# Patient Record
Sex: FEMALE | Race: WHITE | NOT HISPANIC OR LATINO | URBAN - METROPOLITAN AREA
[De-identification: names, ages, dates, MRNs, and addresses within clinical notes are randomized per-mention and may not be internally consistent; named-entity substitution may affect disease eponyms.]

---

## 2021-04-28 ENCOUNTER — APPOINTMENT (OUTPATIENT)
Age: 51
Setting detail: DERMATOLOGY
End: 2021-05-11

## 2021-04-28 VITALS — WEIGHT: 293 LBS | RESPIRATION RATE: 18 BRPM | HEIGHT: 72 IN | TEMPERATURE: 97.8 F

## 2021-04-28 DIAGNOSIS — Z79.899 OTHER LONG TERM (CURRENT) DRUG THERAPY: ICD-10-CM

## 2021-04-28 DIAGNOSIS — L73.2 HIDRADENITIS SUPPURATIVA: ICD-10-CM

## 2021-04-28 PROCEDURE — OTHER MIPS QUALITY: OTHER

## 2021-04-28 PROCEDURE — OTHER MEDICATION COUNSELING: OTHER

## 2021-04-28 PROCEDURE — 11900 INJECT SKIN LESIONS </W 7: CPT

## 2021-04-28 PROCEDURE — OTHER PRESCRIPTION MEDICATION MANAGEMENT: OTHER

## 2021-04-28 PROCEDURE — OTHER PRESCRIPTION: OTHER

## 2021-04-28 PROCEDURE — OTHER COUNSELING: OTHER

## 2021-04-28 PROCEDURE — 99202 OFFICE O/P NEW SF 15 MIN: CPT | Mod: 25

## 2021-04-28 PROCEDURE — OTHER INTRALESIONAL KENALOG: OTHER

## 2021-04-28 RX ORDER — SPIRONOLACTONE 100 MG/1
TABLET, FILM COATED ORAL
Qty: 30 | Refills: 3 | Status: ERX | COMMUNITY
Start: 2021-04-28

## 2021-04-28 RX ORDER — CLINDAMYCIN PHOSPHATE 10 MG/ML
SOLUTION TOPICAL
Qty: 1 | Refills: 3 | Status: ERX

## 2021-04-28 ASSESSMENT — LOCATION DETAILED DESCRIPTION DERM
LOCATION DETAILED: LEFT SUPRAPUBIC SKIN
LOCATION DETAILED: RIGHT AXILLARY VAULT
LOCATION DETAILED: LEFT LATERAL BREAST 2-3:00 REGION
LOCATION DETAILED: RIGHT ANTERIOR MEDIAL PROXIMAL THIGH
LOCATION DETAILED: LEFT ANTERIOR PROXIMAL THIGH
LOCATION DETAILED: LEFT AXILLARY VAULT

## 2021-04-28 ASSESSMENT — LOCATION ZONE DERM
LOCATION ZONE: LEG
LOCATION ZONE: TRUNK
LOCATION ZONE: AXILLAE

## 2021-04-28 ASSESSMENT — HURLEY STAGE
IN YOUR EXPERIENCE, AMONG ALL PATIENTS YOU HAVE SEEN WITH THIS CONDITION, HOW SEVERE IS THIS PATIENT'S CONDITION?: HURLEY STAGE II: SINGLE OR MULTIPLE, WIDELY SEPARATED RECURRENT ABSCESSES WITH SINUS TRACT FORMATION AND SCARRING

## 2021-04-28 ASSESSMENT — LOCATION SIMPLE DESCRIPTION DERM
LOCATION SIMPLE: GROIN
LOCATION SIMPLE: LEFT AXILLARY VAULT
LOCATION SIMPLE: RIGHT AXILLARY VAULT
LOCATION SIMPLE: LEFT THIGH
LOCATION SIMPLE: RIGHT THIGH
LOCATION SIMPLE: LEFT BREAST

## 2021-04-28 NOTE — HPI: RASH (HIDRADENITIS SUPPURATIVA)
Additional History: Lab work was done for Humira ,patient is nervous about going on it due to other health issues \\nStiff heart muscles \\nHigh cholesterol \\nAnxiety\\nDr.Tobi Seo [facility] is doing a Stress test May 19,20\\nPending  that her Doctor will clear her or not for Humira Additional History: Lab work was done for Humira ,patient is nervous about going on it due to other health issues \\nStiff heart muscles \\nHigh cholesterol \\nAnxiety\\nDr.Tobi Seo [facility] Doctor is doing a Stress test May 19,20\\nPending  that her Doctor will clear her or not for Humira

## 2021-04-28 NOTE — PROCEDURE: MEDICATION COUNSELING
Xelmickeyz Pregnancy And Lactation Text: This medication is Pregnancy Category D and is not considered safe during pregnancy.  The risk during breast feeding is also uncertain.

## 2021-04-28 NOTE — PROCEDURE: PRESCRIPTION MEDICATION MANAGEMENT
Continue Regimen: Hibiclens daily \\nCeraVe wash \\nSpironolactone 100mg once daily
Detail Level: Zone
Render In Strict Bullet Format?: No
Initiate Treatment: Clindamycin topical solution apply to affected areas once daily
Plan: Hold off on PA for Humira until patient sees Heart DrKandisFor Stress test and clearance to start Humira \\nFull panel labs drawn by Vandana Amaya were WNL for Humira \\nWe will need to obtain copy of Stress test along with Humira Clearance to start Humira \\nReturn to clinic after we get clearance

## 2023-02-28 ENCOUNTER — APPOINTMENT (OUTPATIENT)
Dept: URBAN - METROPOLITAN AREA CLINIC 265 | Age: 53
Setting detail: DERMATOLOGY
End: 2023-03-03

## 2023-02-28 VITALS — RESPIRATION RATE: 18 BRPM | HEIGHT: 72 IN | WEIGHT: 293 LBS

## 2023-02-28 DIAGNOSIS — L73.2 HIDRADENITIS SUPPURATIVA: ICD-10-CM

## 2023-02-28 DIAGNOSIS — Z79.899 OTHER LONG TERM (CURRENT) DRUG THERAPY: ICD-10-CM

## 2023-02-28 PROCEDURE — OTHER PRESCRIPTION MEDICATION MANAGEMENT: OTHER

## 2023-02-28 PROCEDURE — OTHER MEDICATION COUNSELING: OTHER

## 2023-02-28 PROCEDURE — OTHER ORDER TESTS: OTHER

## 2023-02-28 PROCEDURE — 99214 OFFICE O/P EST MOD 30 MIN: CPT

## 2023-02-28 PROCEDURE — OTHER PRESCRIPTION: OTHER

## 2023-02-28 PROCEDURE — OTHER COUNSELING: OTHER

## 2023-02-28 PROCEDURE — OTHER MIPS QUALITY: OTHER

## 2023-02-28 RX ORDER — CLINDAMYCIN HYDROCHLORIDE 300 MG/1
CAPSULE ORAL
Qty: 20 | Refills: 0 | Status: ERX | COMMUNITY
Start: 2023-02-28

## 2023-02-28 ASSESSMENT — LOCATION SIMPLE DESCRIPTION DERM
LOCATION SIMPLE: RIGHT THIGH
LOCATION SIMPLE: LEFT AXILLARY VAULT
LOCATION SIMPLE: LEFT BREAST
LOCATION SIMPLE: GROIN
LOCATION SIMPLE: LEFT THIGH
LOCATION SIMPLE: RIGHT AXILLARY VAULT

## 2023-02-28 ASSESSMENT — LOCATION ZONE DERM
LOCATION ZONE: AXILLAE
LOCATION ZONE: LEG
LOCATION ZONE: TRUNK

## 2023-02-28 ASSESSMENT — LOCATION DETAILED DESCRIPTION DERM
LOCATION DETAILED: RIGHT ANTERIOR PROXIMAL THIGH
LOCATION DETAILED: LEFT SUPRAPUBIC SKIN
LOCATION DETAILED: RIGHT AXILLARY VAULT
LOCATION DETAILED: LEFT AXILLARY VAULT
LOCATION DETAILED: LEFT ANTERIOR PROXIMAL THIGH
LOCATION DETAILED: LEFT LATERAL BREAST 2-3:00 REGION

## 2023-02-28 NOTE — PROCEDURE: MEDICATION COUNSELING
bladder biopsy Clindamycin Counseling: I counseled the patient regarding use of clindamycin as an antibiotic for prophylactic and/or therapeutic purposes. Clindamycin is active against numerous classes of bacteria, including skin bacteria. Side effects may include nausea, diarrhea, gastrointestinal upset, rash, hives, yeast infections, and in rare cases, colitis.

## 2023-02-28 NOTE — PROCEDURE: PRESCRIPTION MEDICATION MANAGEMENT
Plan: Patient states her stress test was negative. She does not have congestive heart failure. Discussed appropriate expectations for Humira.
Initiate Treatment: clindamycin 300mg, take one pill bid x 10 days
Continue Regimen: Hibiclens daily \\nCeraVe wash \\nSpironolactone 100mg once daily
Detail Level: Zone
Render In Strict Bullet Format?: No

## 2023-04-26 ENCOUNTER — RX ONLY (RX ONLY)
Age: 53
End: 2023-04-26

## 2023-04-26 RX ORDER — DOXYCYCLINE HYCLATE 100 MG/1
CAPSULE, GELATIN COATED ORAL
Qty: 30 | Refills: 1 | Status: ERX

## 2023-06-19 ENCOUNTER — APPOINTMENT (OUTPATIENT)
Dept: URBAN - METROPOLITAN AREA CLINIC 298 | Age: 53
Setting detail: DERMATOLOGY
End: 2023-06-19

## 2023-06-19 DIAGNOSIS — L73.2 HIDRADENITIS SUPPURATIVA: ICD-10-CM

## 2023-06-19 DIAGNOSIS — Z79.899 OTHER LONG TERM (CURRENT) DRUG THERAPY: ICD-10-CM

## 2023-06-19 PROCEDURE — OTHER HURLEY CLASSIFICATION: OTHER

## 2023-06-19 PROCEDURE — OTHER PRESCRIPTION: OTHER

## 2023-06-19 PROCEDURE — OTHER ORDER TESTS: OTHER

## 2023-06-19 PROCEDURE — 99214 OFFICE O/P EST MOD 30 MIN: CPT

## 2023-06-19 PROCEDURE — OTHER MIPS QUALITY: OTHER

## 2023-06-19 PROCEDURE — OTHER PRESCRIPTION MEDICATION MANAGEMENT: OTHER

## 2023-06-19 PROCEDURE — OTHER COUNSELING: OTHER

## 2023-06-19 PROCEDURE — OTHER MEDICATION COUNSELING: OTHER

## 2023-06-19 PROCEDURE — OTHER ADDITIONAL NOTES: OTHER

## 2023-06-19 RX ORDER — DOXYCYCLINE HYCLATE 100 MG/1
CAPSULE, GELATIN COATED ORAL ONCE A DAY
Qty: 30 | Refills: 3 | Status: ERX | COMMUNITY
Start: 2023-06-19

## 2023-06-19 RX ORDER — SPIRONOLACTONE 100 MG/1
TABLET, FILM COATED ORAL
Qty: 30 | Refills: 3 | Status: ERX

## 2023-06-19 ASSESSMENT — LOCATION ZONE DERM
LOCATION ZONE: TRUNK
LOCATION ZONE: LEG

## 2023-06-19 ASSESSMENT — LOCATION DETAILED DESCRIPTION DERM
LOCATION DETAILED: LEFT MEDIAL BUTTOCK
LOCATION DETAILED: LEFT ANTERIOR PROXIMAL THIGH
LOCATION DETAILED: LEFT SUPRAPUBIC SKIN
LOCATION DETAILED: RIGHT ANTERIOR PROXIMAL THIGH

## 2023-06-19 ASSESSMENT — LOCATION SIMPLE DESCRIPTION DERM
LOCATION SIMPLE: RIGHT THIGH
LOCATION SIMPLE: GROIN
LOCATION SIMPLE: LEFT BUTTOCK
LOCATION SIMPLE: LEFT THIGH

## 2023-06-19 NOTE — PROCEDURE: PRESCRIPTION MEDICATION MANAGEMENT
Render In Strict Bullet Format?: No
Plan: Patient’s insurance denied humira; filled out “patient access support enrollment form”\\nPatient will take doxycycline until we get humira approved by insurance
Initiate Treatment: doxycycline hyclate 100 mg capsule once a day\\nQuantity: 30.0 Capsule  Days Supply: 30\\nSig: Take 1 pill po once daily with food
Detail Level: Zone
Continue Regimen: Hibiclens daily \\nCeraVe wash \\nSpironolactone 100mg once daily
Discontinue Regimen: clindamycin 300mg, take one pill bid x 10 days

## 2023-06-19 NOTE — PROCEDURE: HURLEY CLASSIFICATION
Leon Stage I Description: abscess formation (single or multiple) without sinus tracts or scarring
Carolyn Stage II
Detail Level: Zone
Leon Stage Ii Description: single or multiple, widely  recurrent abscesses with sinus tract formation and scarring
Carolyn Stage Iii Description: multiple interconnected sinus tracts and abscesses throughout and entire area

## 2023-06-19 NOTE — PROCEDURE: ADDITIONAL NOTES
Additional Notes: Patient states she has seen a hematologist due to her cbc being abnormal in February 2023\\n\\nLab slip printed and given to patient.\\n\\n[facility], who has seen the patient in the past, has requested the patient get genetic testing performed due to her family history of colon cancer and her previous lab findings.  For this reason, we will wait to start Humira until her genetic potential for cancer is further elucidated.
Detail Level: Zone
Render Risk Assessment In Note?: no
Additional Notes: Patient has tried doxycycline and oral clindamycin for 3 months or more in the past; no improvement

## 2023-10-27 ENCOUNTER — APPOINTMENT (OUTPATIENT)
Dept: URBAN - METROPOLITAN AREA CLINIC 298 | Age: 53
Setting detail: DERMATOLOGY
End: 2023-10-27

## 2023-10-27 VITALS — HEIGHT: 55 IN | WEIGHT: 293 LBS | RESPIRATION RATE: 18 BRPM

## 2023-10-27 DIAGNOSIS — L30.9 DERMATITIS, UNSPECIFIED: ICD-10-CM

## 2023-10-27 PROCEDURE — OTHER COUNSELING: OTHER

## 2023-10-27 PROCEDURE — OTHER PRESCRIPTION: OTHER

## 2023-10-27 PROCEDURE — 11104 PUNCH BX SKIN SINGLE LESION: CPT

## 2023-10-27 PROCEDURE — OTHER BIOPSY BY PUNCH METHOD: OTHER

## 2023-10-27 PROCEDURE — OTHER PRESCRIPTION MEDICATION MANAGEMENT: OTHER

## 2023-10-27 PROCEDURE — OTHER MEDICATION COUNSELING: OTHER

## 2023-10-27 PROCEDURE — 99212 OFFICE O/P EST SF 10 MIN: CPT | Mod: 25

## 2023-10-27 PROCEDURE — OTHER SEPARATE AND IDENTIFIABLE DOCUMENTATION: OTHER

## 2023-10-27 RX ORDER — PREDNISONE 10 MG/1
TABLET ORAL
Qty: 40 | Refills: 0 | Status: ERX | COMMUNITY
Start: 2023-10-27

## 2023-10-27 RX ORDER — CLOBETASOL PROPIONATE 0.5 MG/G
CREAM TOPICAL
Qty: 60 | Refills: 2 | Status: ERX | COMMUNITY
Start: 2023-10-27

## 2023-10-27 ASSESSMENT — LOCATION DETAILED DESCRIPTION DERM
LOCATION DETAILED: RIGHT MID-UPPER BACK
LOCATION DETAILED: RIGHT MEDIAL UPPER BACK
LOCATION DETAILED: LEFT PROXIMAL POSTERIOR UPPER ARM
LOCATION DETAILED: RIGHT PROXIMAL POSTERIOR UPPER ARM

## 2023-10-27 ASSESSMENT — LOCATION ZONE DERM
LOCATION ZONE: ARM
LOCATION ZONE: TRUNK

## 2023-10-27 ASSESSMENT — LOCATION SIMPLE DESCRIPTION DERM
LOCATION SIMPLE: LEFT POSTERIOR UPPER ARM
LOCATION SIMPLE: RIGHT POSTERIOR UPPER ARM
LOCATION SIMPLE: RIGHT UPPER BACK

## 2023-10-27 NOTE — PROCEDURE: MEDICATION COUNSELING
Detail Level: Simple Render Risk Assessment In Note?: no Additional Notes: Continue antibiotics prescribed by PCP until results received Rhofade Counseling: Rhofade is a topical medication which can decrease superficial blood flow where applied. Side effects are uncommon and include stinging, redness and allergic reactions.

## 2023-10-27 NOTE — PROCEDURE: MEDICATION COUNSELING
Negative Birth Control Pills Pregnancy And Lactation Text: This medication should be avoided if pregnant and for the first 30 days post-partum.

## 2023-10-27 NOTE — HPI: RASH
What Type Of Note Output Would You Prefer (Optional)?: Bullet Format
How Severe Is Your Rash?: moderate
Is This A New Presentation, Or A Follow-Up?: Rash
Additional History: Patient states recent viral illness about a week ago, then rash appeared. Patient also has started Humira in September and has taken 2 doses. Her rheumatologist has told her to stop injections until rash clears.

## 2023-10-27 NOTE — PROCEDURE: PRESCRIPTION MEDICATION MANAGEMENT
Plan: Return to clinic pending pathology results.  Pt has stopped Humira for now per Rheumatology recommendation.  Possible drug reaction vs viral exanthem vs autoimmune.  Pt given physician contact info to call should she have significant stomach issues with addition of prednisone.  She will take Pepcid complete prior to meals and take aspirin at dinner.  She will discuss her GI symptoms with PCP, as they are likely stemming from her Ozempic.  Topical steroid as directed. \\nFollow-up pending path results.
Detail Level: Zone
Initiate Treatment: Prednisone 10mg, take 40mg by mouth once daily x 4 days, then 30mg x 4 days, then 20mg x 4 days, then 10mg x 4 days \\nClobetasol cream, apply to affected areas of rash twice a day x 2 weeks, stop x 1 week, repeat cycle as needed
Render In Strict Bullet Format?: No

## 2023-10-27 NOTE — PROCEDURE: MEDICATION COUNSELING
Complex Repair Preamble Text (Leave Blank If You Do Not Want): Extensive wide undermining was performed. 5-Fu Pregnancy And Lactation Text: This medication is Pregnancy Category X and contraindicated in pregnancy and in women who may become pregnant. It is unknown if this medication is excreted in breast milk.

## 2023-11-16 ENCOUNTER — APPOINTMENT (OUTPATIENT)
Dept: URBAN - METROPOLITAN AREA CLINIC 298 | Age: 53
Setting detail: DERMATOLOGY
End: 2023-11-16

## 2023-11-16 DIAGNOSIS — L30.8 OTHER SPECIFIED DERMATITIS: ICD-10-CM

## 2023-11-16 PROCEDURE — OTHER ORDER TESTS: OTHER

## 2023-11-16 NOTE — PROCEDURE: ORDER TESTS
Performing Laboratory: 0
Expected Date Of Service: 11/16/2023
Bill For Surgical Tray: no
Billing Type: Third-Party Bill

## 2023-11-21 ENCOUNTER — RX ONLY (RX ONLY)
Age: 53
End: 2023-11-21

## 2023-11-21 RX ORDER — DESONIDE 0.5 MG/G
CREAM TOPICAL
Qty: 60 | Refills: 3 | Status: ERX | COMMUNITY
Start: 2023-11-21

## 2024-02-23 NOTE — PROCEDURE: ORDER TESTS
no
Performing Laboratory: [id number]
Bill For Surgical Tray: no
Billing Type: Third-Party Bill
Expected Date Of Service: 06/19/2023